# Patient Record
Sex: MALE | Race: WHITE | NOT HISPANIC OR LATINO | Employment: FULL TIME | URBAN - NONMETROPOLITAN AREA
[De-identification: names, ages, dates, MRNs, and addresses within clinical notes are randomized per-mention and may not be internally consistent; named-entity substitution may affect disease eponyms.]

---

## 2022-06-09 ENCOUNTER — OFFICE VISIT (OUTPATIENT)
Dept: CARDIOLOGY | Facility: CLINIC | Age: 27
End: 2022-06-09

## 2022-06-09 VITALS
DIASTOLIC BLOOD PRESSURE: 76 MMHG | BODY MASS INDEX: 36.48 KG/M2 | WEIGHT: 293.4 LBS | SYSTOLIC BLOOD PRESSURE: 118 MMHG | HEIGHT: 75 IN | HEART RATE: 85 BPM

## 2022-06-09 DIAGNOSIS — E88.81 METABOLIC SYNDROME: ICD-10-CM

## 2022-06-09 DIAGNOSIS — R07.89 CHEST HEAVINESS: Primary | ICD-10-CM

## 2022-06-09 DIAGNOSIS — I10 PRIMARY HYPERTENSION: ICD-10-CM

## 2022-06-09 DIAGNOSIS — I34.0 NONRHEUMATIC MITRAL VALVE REGURGITATION: ICD-10-CM

## 2022-06-09 DIAGNOSIS — R01.1 MURMUR, CARDIAC: ICD-10-CM

## 2022-06-09 PROCEDURE — 93000 ELECTROCARDIOGRAM COMPLETE: CPT | Performed by: INTERNAL MEDICINE

## 2022-06-09 PROCEDURE — 99204 OFFICE O/P NEW MOD 45 MIN: CPT | Performed by: INTERNAL MEDICINE

## 2022-06-09 RX ORDER — LISINOPRIL 5 MG/1
5 TABLET ORAL DAILY
COMMUNITY

## 2022-06-09 NOTE — PROGRESS NOTES
Chief Complaint   Patient presents with   • Establish Care     Pt is here to establish care.  He states he started having CP about 3 months ago.  He reports it has resolved since being started on his blood pressure medications.  He denies SOB, dizziness or palpitations.   • Cardiac History     Pt states he was previously evaluated by cardiology about 7 years ago.  He states the provider is no longer practicing.  (Dr Field) He states they did a stress and echo at that time- he states they reported it    • Lab Work     Pt's last labs were in March 2022 with his PCP.        CARDIAC COMPLAINTS  chest pressure/discomfort      Subjective   Bo Gatica is a 26 y.o. male came in today for his initial cardiac evaluation.  He started having chest pain on and off but 3 months ago.  It was a sharp pain in the middle part of the chest and then developed back pressure-like feeling in the middle and the pain went all the way to the back.  He went to the emergency room.  He is initial heart rate and blood pressure appears to be stable.  His lab work was normal.  He was given Toradol and sent home.  He was then seen at your office.  He continued to have some discomfort on and off to that.  At your office he noticed his blood pressure was moderately elevated at 170/95.  He was given lisinopril to get his blood pressure controlled.  It was started at 20 mg and gradually reduced.  He also was told to start losing weight.  Since he has been taking the medication he has not had any more chest pain.  He denies having any shortness of breath, dizziness or loss of consciousness.  He denies having any palpitation.  He did undergo lab work at your office.  I do not have the results but according to him his lipids were within normal limit.  He has no history of smoking no history of alcohol or drug abuse.  There is no family history of hypertension.    Past Surgical History:   Procedure Laterality Date   • CARDIOVASCULAR STRESS TEST  2016  "  • ECHO - CONVERTED  2016       Current Outpatient Medications   Medication Sig Dispense Refill   • lisinopril (PRINIVIL,ZESTRIL) 5 MG tablet Take 5 mg by mouth Daily.       No current facility-administered medications for this visit.           ALLERGIES:  Bactrim [sulfamethoxazole-trimethoprim]    Past Medical History:   Diagnosis Date   • Hypertension        Social History     Tobacco Use   Smoking Status Never Smoker   Smokeless Tobacco Never Used          Family History   Problem Relation Age of Onset   • No Known Problems Mother    • No Known Problems Father    • No Known Problems Sister    • No Known Problems Brother    • Cancer Maternal Grandmother    • No Known Problems Maternal Grandfather        Review of Systems   Constitutional: Negative for decreased appetite and malaise/fatigue.   HENT: Negative for congestion and sore throat.    Eyes: Negative for blurred vision.   Cardiovascular: Positive for chest pain. Negative for dyspnea on exertion and palpitations.   Respiratory: Negative for shortness of breath and snoring.    Endocrine: Negative for cold intolerance and heat intolerance.   Hematologic/Lymphatic: Negative for adenopathy. Does not bruise/bleed easily.   Skin: Negative for itching, nail changes and skin cancer.   Musculoskeletal: Negative for arthritis and myalgias.   Gastrointestinal: Negative for abdominal pain, dysphagia and heartburn.   Genitourinary: Negative for bladder incontinence and frequency.   Neurological: Negative for dizziness, light-headedness, seizures and vertigo.   Psychiatric/Behavioral: Negative for altered mental status.   Allergic/Immunologic: Negative for environmental allergies and hives.       Diabetes- No  Thyroid- normal    Objective     /76 (BP Location: Right arm, Patient Position: Sitting)   Pulse 85   Ht 190.5 cm (75\")   Wt 133 kg (293 lb 6.4 oz)   BMI 36.67 kg/m²     Vitals and nursing note reviewed.   Constitutional:       Appearance: Healthy " appearance. Not in distress.   Eyes:      Conjunctiva/sclera: Conjunctivae normal.      Pupils: Pupils are equal, round, and reactive to light.   HENT:      Head: Normocephalic.   Pulmonary:      Effort: Pulmonary effort is normal.      Breath sounds: Normal breath sounds.   Cardiovascular:      PMI at left midclavicular line. Normal rate. Regular rhythm.      Murmurs: There is a grade 3/6 high frequency blowing holosystolic murmur at the apex.   Abdominal:      General: Bowel sounds are normal.      Palpations: Abdomen is soft.   Musculoskeletal: Normal range of motion.      Cervical back: Normal range of motion and neck supple. Skin:     General: Skin is warm and dry.   Neurological:      Mental Status: Alert, oriented to person, place, and time and oriented to person, place and time.           ECG 12 Lead    Date/Time: 6/9/2022 2:10 PM  Performed by: Kevin Thomas MD  Authorized by: Kevin Thomas MD   Comparison: compared with previous ECG from 3/25/2022  Similar to previous ECG  Rhythm: sinus rhythm  Rate: normal  QRS axis: normal  Other findings: early repolarization    Clinical impression: non-specific ECG              @ASSESSMENT/PLAN@  Class 2 Severe Obesity (BMI >=35 and <=39.9). Obesity-related health conditions include the following: hypertension. Obesity is newly identified. BMI is is above average; BMI management plan is completed. We discussed low calorie, low carb based diet program, portion control and increasing exercise.     Diagnoses and all orders for this visit:    1. Chest heaviness (Primary)  -     Adult Transthoracic Echo Complete W/ Cont if Necessary Per Protocol; Future    2. Primary hypertension    3. Metabolic syndrome    4. Murmur, cardiac  -     Adult Transthoracic Echo Complete W/ Cont if Necessary Per Protocol; Future    5. Nonrheumatic mitral valve regurgitation    At baseline his heart rate is stable.  His blood pressure is normal.  His EKG showed sinus rhythm with no  evidence of LVH but did have some nonspecific ST changes.  His clinical examination reveals a BMI of 37.  He does have soft systolic murmur at the mitral area.  He has normal peripheral pulse and no pedal edema.    Regarding the chest heaviness, it appears to be secondary to elevated blood pressure.  At this time he is not having any symptoms.  His EKG does look normal.  I did not schedule him to undergo any stress test at this time.  If he develops any more chest tightness, then he is advised to call our office and we will schedule a regular stress test.    Regarding his cardiac murmur, it appears to be secondary to hypertension nature.  I explained to him about the cardiac murmur and also explained to him about possibility of LVH.  I schedule him to undergo an echocardiogram to look for LVH, LV function, valvular structures as well as the PA pressure.  If there is significant LVH then he may need to be on a low-dose of ACE inhibitor's for long period of time.    Regarding his hypertension, at this time low-dose of lisinopril seems to be controlling it.  Continue the same for now.  If he develops any palpitation or if his blood pressure started going up again, then low-dose of beta-blockers can be added    Regarding the metabolic syndrome, I had a very long talk with him about diet and weight reduction.  I gave him papers on Mediterranean diet.    Overall cardiac status appears stable.  I will see him back in 6 months or sooner if needed.               Electronically signed by Kevin Thomas MD June 9, 2022 14:00 EDT

## 2022-06-27 ENCOUNTER — APPOINTMENT (OUTPATIENT)
Dept: CARDIOLOGY | Facility: HOSPITAL | Age: 27
End: 2022-06-27